# Patient Record
Sex: FEMALE | Race: WHITE | Employment: FULL TIME | URBAN - METROPOLITAN AREA
[De-identification: names, ages, dates, MRNs, and addresses within clinical notes are randomized per-mention and may not be internally consistent; named-entity substitution may affect disease eponyms.]

---

## 2019-10-11 ENCOUNTER — HOSPITAL ENCOUNTER (EMERGENCY)
Age: 59
Discharge: HOME | End: 2019-10-11

## 2019-10-11 VITALS
HEART RATE: 95 BPM | TEMPERATURE: 97.8 F | SYSTOLIC BLOOD PRESSURE: 144 MMHG | RESPIRATION RATE: 20 BRPM | WEIGHT: 170 LBS | OXYGEN SATURATION: 98 % | DIASTOLIC BLOOD PRESSURE: 78 MMHG

## 2019-10-11 DIAGNOSIS — L03.211 FACIAL CELLULITIS: Primary | ICD-10-CM

## 2019-10-11 PROCEDURE — 99212 OFFICE O/P EST SF 10 MIN: CPT

## 2019-10-11 PROCEDURE — 6370000000 HC RX 637 (ALT 250 FOR IP): Performed by: PHYSICIAN ASSISTANT

## 2019-10-11 RX ORDER — SULFAMETHOXAZOLE AND TRIMETHOPRIM 800; 160 MG/1; MG/1
1 TABLET ORAL 2 TIMES DAILY
Qty: 20 TABLET | Refills: 0 | Status: SHIPPED | OUTPATIENT
Start: 2019-10-11 | End: 2019-10-21

## 2019-10-11 RX ADMIN — FLUORESCEIN SODIUM 1 MG: 1 STRIP OPHTHALMIC at 08:54

## 2019-10-11 ASSESSMENT — VISUAL ACUITY
OD: 20/40
OU: 20/30
OS: 20/100
OD: 20/50
OU: 20/40
OS: 20/50

## 2020-06-04 ENCOUNTER — APPOINTMENT (OUTPATIENT)
Dept: GENERAL RADIOLOGY | Age: 60
End: 2020-06-04

## 2020-06-04 ENCOUNTER — HOSPITAL ENCOUNTER (EMERGENCY)
Age: 60
Discharge: HOME | End: 2020-06-04

## 2020-06-04 VITALS
HEART RATE: 89 BPM | OXYGEN SATURATION: 99 % | SYSTOLIC BLOOD PRESSURE: 145 MMHG | WEIGHT: 165 LBS | RESPIRATION RATE: 20 BRPM | DIASTOLIC BLOOD PRESSURE: 85 MMHG | TEMPERATURE: 98.5 F

## 2020-06-04 PROCEDURE — 90715 TDAP VACCINE 7 YRS/> IM: CPT | Performed by: PHYSICIAN ASSISTANT

## 2020-06-04 PROCEDURE — 6360000002 HC RX W HCPCS: Performed by: PHYSICIAN ASSISTANT

## 2020-06-04 PROCEDURE — 73610 X-RAY EXAM OF ANKLE: CPT

## 2020-06-04 PROCEDURE — 90471 IMMUNIZATION ADMIN: CPT | Performed by: PHYSICIAN ASSISTANT

## 2020-06-04 PROCEDURE — 99212 OFFICE O/P EST SF 10 MIN: CPT

## 2020-06-04 RX ORDER — CEPHALEXIN 500 MG/1
500 CAPSULE ORAL 3 TIMES DAILY
Qty: 21 CAPSULE | Refills: 0 | Status: SHIPPED | OUTPATIENT
Start: 2020-06-04 | End: 2020-06-11

## 2020-06-04 RX ADMIN — TETANUS TOXOID, REDUCED DIPHTHERIA TOXOID AND ACELLULAR PERTUSSIS VACCINE, ADSORBED 0.5 ML: 5; 2.5; 8; 8; 2.5 SUSPENSION INTRAMUSCULAR at 16:28

## 2020-06-04 NOTE — ED PROVIDER NOTES
Neurological:      Mental Status: She is alert and oriented to person, place, and time. GCS: GCS eye subscore is 4. GCS verbal subscore is 5. GCS motor subscore is 6. Cranial Nerves: No cranial nerve deficit. Sensory: No sensory deficit. Coordination: Coordination normal.      Gait: Gait normal.         Procedures    MDM  Number of Diagnoses or Management Options  Abrasion of left ankle, initial encounter:   Sprain of left ankle, unspecified ligament, initial encounter:   Diagnosis management comments: Xray reviewed. Instructions given  She has ace wrap at home.            --------------------------------------------- PAST HISTORY ---------------------------------------------  Past Medical History:  has no past medical history on file. Past Surgical History:  has no past surgical history on file. Social History:  reports that she has been smoking cigarettes. She has been smoking about 1.50 packs per day. She has never used smokeless tobacco. She reports that she does not use drugs. Family History: family history is not on file. The patients home medications have been reviewed. Allergies: Patient has no known allergies. -------------------------------------------------- RESULTS -------------------------------------------------  No results found for this visit on 06/04/20. XR ANKLE LEFT (MIN 3 VIEWS)   Final Result   No acute findings in the left ankle.             ------------------------- NURSING NOTES AND VITALS REVIEWED ---------------------------   The nursing notes within the ED encounter and vital signs as below have been reviewed.    BP (!) 145/85   Pulse 89   Temp 98.5 °F (36.9 °C) (Oral)   Resp 20   Wt 165 lb (74.8 kg)   SpO2 99%   Oxygen Saturation Interpretation: Normal      ------------------------------------------ PROGRESS NOTES ------------------------------------------   I have spoken with the patient and discussed todays results, in addition to

## 2021-12-07 ENCOUNTER — OFFICE VISIT (OUTPATIENT)
Dept: FAMILY MEDICINE CLINIC | Age: 61
End: 2021-12-07

## 2021-12-07 VITALS
RESPIRATION RATE: 16 BRPM | BODY MASS INDEX: 29.06 KG/M2 | WEIGHT: 157.9 LBS | OXYGEN SATURATION: 98 % | TEMPERATURE: 98.7 F | SYSTOLIC BLOOD PRESSURE: 120 MMHG | HEART RATE: 76 BPM | DIASTOLIC BLOOD PRESSURE: 70 MMHG | HEIGHT: 62 IN

## 2021-12-07 DIAGNOSIS — Z83.3 FH: DIABETES MELLITUS: ICD-10-CM

## 2021-12-07 DIAGNOSIS — Z80.3 FHX: BREAST CANCER IN FIRST DEGREE RELATIVE: ICD-10-CM

## 2021-12-07 DIAGNOSIS — Z76.89 ENCOUNTER TO ESTABLISH CARE: Primary | ICD-10-CM

## 2021-12-07 DIAGNOSIS — R05.3 CHRONIC COUGH: ICD-10-CM

## 2021-12-07 DIAGNOSIS — Z83.438 FH: HYPERLIPIDEMIA: ICD-10-CM

## 2021-12-07 DIAGNOSIS — Z12.31 ENCOUNTER FOR SCREENING MAMMOGRAM FOR BREAST CANCER: ICD-10-CM

## 2021-12-07 DIAGNOSIS — Z13.9 ENCOUNTER FOR SCREENING INVOLVING SOCIAL DETERMINANTS OF HEALTH (SDOH): ICD-10-CM

## 2021-12-07 DIAGNOSIS — F17.210 SMOKING GREATER THAN 40 PACK YEARS: ICD-10-CM

## 2021-12-07 PROCEDURE — 99203 OFFICE O/P NEW LOW 30 MIN: CPT | Performed by: NURSE PRACTITIONER

## 2021-12-07 SDOH — ECONOMIC STABILITY: INCOME INSECURITY: IN THE LAST 12 MONTHS, WAS THERE A TIME WHEN YOU WERE NOT ABLE TO PAY THE MORTGAGE OR RENT ON TIME?: NO

## 2021-12-07 SDOH — ECONOMIC STABILITY: FOOD INSECURITY: WITHIN THE PAST 12 MONTHS, THE FOOD YOU BOUGHT JUST DIDN'T LAST AND YOU DIDN'T HAVE MONEY TO GET MORE.: NEVER TRUE

## 2021-12-07 SDOH — ECONOMIC STABILITY: HOUSING INSECURITY
IN THE LAST 12 MONTHS, WAS THERE A TIME WHEN YOU DID NOT HAVE A STEADY PLACE TO SLEEP OR SLEPT IN A SHELTER (INCLUDING NOW)?: NO

## 2021-12-07 SDOH — ECONOMIC STABILITY: FOOD INSECURITY: WITHIN THE PAST 12 MONTHS, YOU WORRIED THAT YOUR FOOD WOULD RUN OUT BEFORE YOU GOT MONEY TO BUY MORE.: NEVER TRUE

## 2021-12-07 SDOH — ECONOMIC STABILITY: TRANSPORTATION INSECURITY
IN THE PAST 12 MONTHS, HAS LACK OF TRANSPORTATION KEPT YOU FROM MEETINGS, WORK, OR FROM GETTING THINGS NEEDED FOR DAILY LIVING?: NO

## 2021-12-07 SDOH — ECONOMIC STABILITY: TRANSPORTATION INSECURITY
IN THE PAST 12 MONTHS, HAS THE LACK OF TRANSPORTATION KEPT YOU FROM MEDICAL APPOINTMENTS OR FROM GETTING MEDICATIONS?: NO

## 2021-12-07 ASSESSMENT — PATIENT HEALTH QUESTIONNAIRE - PHQ9
SUM OF ALL RESPONSES TO PHQ QUESTIONS 1-9: 0
2. FEELING DOWN, DEPRESSED OR HOPELESS: 0
SUM OF ALL RESPONSES TO PHQ9 QUESTIONS 1 & 2: 0
SUM OF ALL RESPONSES TO PHQ QUESTIONS 1-9: 0
1. LITTLE INTEREST OR PLEASURE IN DOING THINGS: 0
SUM OF ALL RESPONSES TO PHQ QUESTIONS 1-9: 0

## 2021-12-07 ASSESSMENT — ENCOUNTER SYMPTOMS
RHINORRHEA: 0
CONSTIPATION: 0
COUGH: 1
NAUSEA: 0
COLOR CHANGE: 0
BACK PAIN: 0
SINUS PAIN: 0
WHEEZING: 0
CHEST TIGHTNESS: 0
ABDOMINAL PAIN: 0
DIARRHEA: 0
TROUBLE SWALLOWING: 0
FACIAL SWELLING: 0
SORE THROAT: 0
SHORTNESS OF BREATH: 1
VOMITING: 0
VOICE CHANGE: 0
SINUS PRESSURE: 0

## 2021-12-07 ASSESSMENT — SOCIAL DETERMINANTS OF HEALTH (SDOH): HOW HARD IS IT FOR YOU TO PAY FOR THE VERY BASICS LIKE FOOD, HOUSING, MEDICAL CARE, AND HEATING?: NOT HARD AT ALL

## 2021-12-07 NOTE — ASSESSMENT & PLAN NOTE
New Uncontrolled, lifestyle modifications recommended and declines medication today states she will just slow herself down.

## 2021-12-07 NOTE — PROGRESS NOTES
OFFICE PROGRESS NOTE  [address]  [address] [address] [id number]  Dept: [phone]   Chief Complaint   Patient presents with   Juani Coates Establish Care     Has not seen a Doctor in over 15 years    Health Maintenance     Due for Flu, shingles, mammogram, colonoscopy, diabetes screening, lipid, pap,hiv, covid Pneumococcal       ASSESSMENT/PLAN   1. Encounter to establish care  2. Chronic cough  Assessment & Plan:  New Unclear control, lifestyle modifications recommended and CXR today, fasting labs  Orders:  -     CBC Auto Differential; Future  -     Comprehensive Metabolic Panel; Future  -     XR CHEST (2 VW); Future  3. FH: diabetes mellitus  -     Hemoglobin A1C; Future  4. FH: hyperlipidemia  -     Lipid Panel; Future  5. Encounter for screening mammogram for breast cancer  -     INDIO DIGITAL SCREEN W OR WO CAD BILATERAL; Future  6. FHx: breast cancer in first degree relative  -     INDIO DIGITAL SCREEN W OR WO CAD BILATERAL; Future  7. Smoking greater than 40 pack years  Assessment & Plan:  New Uncontrolled, lifestyle modifications recommended and declines medication today states she will just slow herself down. 8. Encounter for screening involving social determinants of health [facility])  Assessment & Plan:   New referral to  for financial assistance  Orders:  -     Ambulatory referral to Social Work         Discussed smoking cessation and Discussed exercising 30 minutes daily    Return in about 1 month (around 1/7/2022) for Cough, test results, lab results. HPI:   Here to establish care hasn't seen a provider in 15 years or longer. She has hx of ADHD, allergic rhinitis, asthma, skin cancer, hx of TB. She has a long history of smoking for 48 years was up to 3 ppd and now trying to cut down and only smoking 1/2 - 1 ppd now. she has had a cough since 2019 when she was sick in November 2019 and no energy since then.  At times she will become SOB worse with mask on. She declines flu vaccine and Covid vaccine. She also has a history of TB. She states her weight has been stable. She hasn't had a mammogram in years and there is a FH of breast cancer in her mom. No current outpatient medications on file. Surgical History:  has a past surgical history that includes Clavicle excision; Tubal ligation; Gallbladder surgery; and Cholecystectomy. Social History:  reports that she has been smoking cigarettes. She has a 72.00 pack-year smoking history. She has never used smokeless tobacco. She reports previous alcohol use. She reports that she does not use drugs. Family History: family history includes Breast Cancer in her mother; COPD in her sister; Cancer in her father and sister; Diabetes in her brother and mother; High Blood Pressure in her mother; High Cholesterol in her mother. I have reviewed Roxanne's allergies, medications, problem list, medical, social and family history and have updated as needed in the electronic medical record    Review of Systems   Constitutional: Positive for fatigue. Negative for activity change, appetite change, chills, diaphoresis, fever and unexpected weight change. HENT: Positive for postnasal drip. Negative for congestion, dental problem, drooling, ear discharge, ear pain, facial swelling, hearing loss, mouth sores, nosebleeds, rhinorrhea, sinus pressure, sinus pain, sneezing, sore throat, tinnitus, trouble swallowing and voice change. Eyes: Negative for visual disturbance. Respiratory: Positive for cough and shortness of breath. Negative for chest tightness and wheezing. Hx of TB   Cardiovascular: Negative for chest pain, palpitations and leg swelling. Gastrointestinal: Negative for abdominal pain, constipation, diarrhea, nausea and vomiting. Endocrine: Negative for cold intolerance, heat intolerance, polydipsia, polyphagia and polyuria.    Genitourinary: Negative for difficulty urinating, frequency and urgency. Musculoskeletal: Negative for arthralgias, back pain, gait problem, joint swelling, myalgias, neck pain and neck stiffness. Skin: Negative for color change, pallor, rash and wound. Allergic/Immunologic: Positive for environmental allergies. Negative for food allergies and immunocompromised state. Neurological: Negative for dizziness, tremors, seizures, syncope, facial asymmetry, speech difficulty, weakness, light-headedness, numbness and headaches. Hematological: Negative for adenopathy. Does not bruise/bleed easily. Psychiatric/Behavioral: Negative for agitation, behavioral problems, confusion, decreased concentration, dysphoric mood, hallucinations, self-injury, sleep disturbance and suicidal ideas. The patient is not nervous/anxious and is not hyperactive. OBJECTIVE:     VS:  Wt Readings from Last 3 Encounters:   12/07/21 157 lb 14.4 oz (71.6 kg)   06/04/20 165 lb (74.8 kg)   10/11/19 170 lb (77.1 kg)                       Vitals:    12/07/21 1331   BP: 120/70   Pulse: 76   Resp: 16   Temp: 98.7 °F (37.1 °C)   TempSrc: Temporal   SpO2: 98%   Weight: 157 lb 14.4 oz (71.6 kg)   Height: 5' 2\" (1.575 m)       General: Alert and oriented to person, place, and time, well developed and well nourished, in no acute distress  SKIN: Warm and dry, intact without any rash, masses or lesions  HEAD: normocephalic, atraumatic  Eyes: sclera/conjunctiva clear, PERRLA, EOMI's intact  ENT: tympanic membranes, external ear and ear canal normal bilaterally, normal hearing, Nose without deformity, nasal mucosa and turbinates normal without polyps   Throat: clear, tongue midline, tonsils 2+, no drainage, no masses or lesions noted, rotting teeth  Neck: supple and non-tender without mass, trachea midline, no cervical lymphadenopathy, no bruit, no thyromegaly or nodules  Cardiovascular: regular rate and regular rhythm, normal S1 and S2,  no murmurs, rubs, clicks, or gallop.  Distal pulses intact, no carotid bruits. No edema  Pulmonary/Chest: clear but diminished in the bases to auscultation bilaterally, no wheezes, rales or rhonchi, diminished air movement, no respiratory distress  Abdomen: soft, non-tender, non-distended, normal bowel sounds, no masses or hepatosplenomegaly  Musculoskeletal: Normal ROM, no joint swelling, deformity or tenderness   Neurologic: reflexes normal and symmetric, no cranial nerve deficit, gait, coordination and speech normal  Extremities: no clubbing, cyanosis, or edema. Psychiatric: Good eye contact, normal mood and affect, answers questions appropriately    I have reviewed my findings and recommendations with Quinn Bundy.     Taylor Solis, NELLIE - CNP, NP-C, FNP-BC

## 2021-12-08 ENCOUNTER — TELEPHONE (OUTPATIENT)
Dept: FAMILY MEDICINE CLINIC | Age: 61
End: 2021-12-08

## 2021-12-08 DIAGNOSIS — Z83.3 FH: DIABETES MELLITUS: ICD-10-CM

## 2021-12-08 DIAGNOSIS — Z83.438 FH: HYPERLIPIDEMIA: ICD-10-CM

## 2021-12-08 DIAGNOSIS — R05.3 CHRONIC COUGH: ICD-10-CM

## 2021-12-08 DIAGNOSIS — J45.40 MODERATE PERSISTENT ASTHMA WITHOUT COMPLICATION: Primary | ICD-10-CM

## 2021-12-08 LAB
ALBUMIN SERPL-MCNC: 3.4 G/DL (ref 3.5–5.2)
ALP BLD-CCNC: 83 U/L (ref 35–104)
ALT SERPL-CCNC: 11 U/L (ref 0–32)
ANION GAP SERPL CALCULATED.3IONS-SCNC: 12 MMOL/L (ref 7–16)
AST SERPL-CCNC: 20 U/L (ref 0–31)
BASOPHILS ABSOLUTE: 0.06 E9/L (ref 0–0.2)
BASOPHILS RELATIVE PERCENT: 0.6 % (ref 0–2)
BILIRUB SERPL-MCNC: 0.3 MG/DL (ref 0–1.2)
BUN BLDV-MCNC: 5 MG/DL (ref 6–23)
CALCIUM SERPL-MCNC: 9.3 MG/DL (ref 8.6–10.2)
CHLORIDE BLD-SCNC: 103 MMOL/L (ref 98–107)
CHOLESTEROL, TOTAL: 149 MG/DL (ref 0–199)
CO2: 26 MMOL/L (ref 22–29)
CREAT SERPL-MCNC: 0.7 MG/DL (ref 0.5–1)
EOSINOPHILS ABSOLUTE: 0.34 E9/L (ref 0.05–0.5)
EOSINOPHILS RELATIVE PERCENT: 3.4 % (ref 0–6)
GFR AFRICAN AMERICAN: >60
GFR NON-AFRICAN AMERICAN: >60 ML/MIN/1.73
GLUCOSE BLD-MCNC: 93 MG/DL (ref 74–99)
HBA1C MFR BLD: 5.8 % (ref 4–5.6)
HCT VFR BLD CALC: 43.7 % (ref 34–48)
HDLC SERPL-MCNC: 34 MG/DL
HEMOGLOBIN: 14.1 G/DL (ref 11.5–15.5)
IMMATURE GRANULOCYTES #: 0.04 E9/L
IMMATURE GRANULOCYTES %: 0.4 % (ref 0–5)
LDL CHOLESTEROL CALCULATED: 82 MG/DL (ref 0–99)
LYMPHOCYTES ABSOLUTE: 4.47 E9/L (ref 1.5–4)
LYMPHOCYTES RELATIVE PERCENT: 45 % (ref 20–42)
MCH RBC QN AUTO: 29.1 PG (ref 26–35)
MCHC RBC AUTO-ENTMCNC: 32.3 % (ref 32–34.5)
MCV RBC AUTO: 90.3 FL (ref 80–99.9)
MONOCYTES ABSOLUTE: 0.6 E9/L (ref 0.1–0.95)
MONOCYTES RELATIVE PERCENT: 6 % (ref 2–12)
NEUTROPHILS ABSOLUTE: 4.43 E9/L (ref 1.8–7.3)
NEUTROPHILS RELATIVE PERCENT: 44.6 % (ref 43–80)
PDW BLD-RTO: 14.4 FL (ref 11.5–15)
PLATELET # BLD: 342 E9/L (ref 130–450)
PMV BLD AUTO: 11 FL (ref 7–12)
POTASSIUM SERPL-SCNC: 4 MMOL/L (ref 3.5–5)
RBC # BLD: 4.84 E12/L (ref 3.5–5.5)
SODIUM BLD-SCNC: 141 MMOL/L (ref 132–146)
TOTAL PROTEIN: 7 G/DL (ref 6.4–8.3)
TRIGL SERPL-MCNC: 163 MG/DL (ref 0–149)
VLDLC SERPL CALC-MCNC: 33 MG/DL
WBC # BLD: 9.9 E9/L (ref 4.5–11.5)

## 2021-12-08 RX ORDER — ALBUTEROL SULFATE 90 UG/1
2 AEROSOL, METERED RESPIRATORY (INHALATION) EVERY 6 HOURS PRN
Qty: 18 G | Refills: 3 | Status: SHIPPED | OUTPATIENT
Start: 2021-12-08

## 2021-12-08 RX ORDER — BUDESONIDE AND FORMOTEROL FUMARATE DIHYDRATE 80; 4.5 UG/1; UG/1
2 AEROSOL RESPIRATORY (INHALATION) 2 TIMES DAILY
Qty: 10.2 G | Refills: 3
Start: 2021-12-08 | End: 2021-12-10 | Stop reason: SDUPTHER

## 2021-12-08 NOTE — TELEPHONE ENCOUNTER
Spoke to pt, she states she does not recall the names of them because it was over 25 years ago. She does not have RX assistance, I gave her the [facility] information and phone number.

## 2021-12-08 NOTE — TELEPHONE ENCOUNTER
Spoke with pt, she states she is will to try an inhaler. She states that she was previously on 3 different ones.

## 2021-12-08 NOTE — TELEPHONE ENCOUNTER
Call Stephanie Huang is negative so could be related to her asthma hx.  Is she willing to try inhaler??? Will need RX assistance

## 2021-12-08 NOTE — TELEPHONE ENCOUNTER
Does she know what she was on before and did it work.  Does she have RX assistance if not give her information for [employer] please

## 2021-12-09 ENCOUNTER — TELEPHONE (OUTPATIENT)
Dept: INTERNAL MEDICINE | Age: 61
End: 2021-12-09

## 2021-12-10 ENCOUNTER — TELEPHONE (OUTPATIENT)
Dept: FAMILY MEDICINE CLINIC | Age: 61
End: 2021-12-10

## 2021-12-10 ENCOUNTER — TELEPHONE (OUTPATIENT)
Dept: INTERNAL MEDICINE | Age: 61
End: 2021-12-10

## 2021-12-10 DIAGNOSIS — J45.40 MODERATE PERSISTENT ASTHMA WITHOUT COMPLICATION: ICD-10-CM

## 2021-12-10 RX ORDER — BUDESONIDE AND FORMOTEROL FUMARATE DIHYDRATE 80; 4.5 UG/1; UG/1
2 AEROSOL RESPIRATORY (INHALATION) 2 TIMES DAILY
Qty: 10.2 G | Refills: 3 | Status: SHIPPED | OUTPATIENT
Start: 2021-12-10

## 2021-12-10 NOTE — TELEPHONE ENCOUNTER
Spoke with pt. Instructed her to cut back on sugars(Pepsi), breads, and pastas. Pt agrees and states she will try.

## 2021-12-10 NOTE — TELEPHONE ENCOUNTER
Received call from [facility]; referral was received and she will mail pt enrollment paperwork to complete and sign and return to [facility] office as well as to provider who will return to [facility] office after completion.

## 2021-12-10 NOTE — TELEPHONE ENCOUNTER
Rec'd call stating Leno Hodges ordered 2 inhalers for patient, but pharmacy only rec'd 1 RX. I noted order for Symbicort was placed, but did not go through electronically. Please submit E script so patient can  medication today.     Last seen 12/7/2021  Next appt 1/10/2022  [facility]

## 2022-01-06 PROBLEM — Z12.31 ENCOUNTER FOR SCREENING MAMMOGRAM FOR BREAST CANCER: Status: RESOLVED | Noted: 2021-12-07 | Resolved: 2022-01-06

## 2022-08-15 ENCOUNTER — TELEPHONE (OUTPATIENT)
Dept: OTHER | Facility: CLINIC | Age: 62
End: 2022-08-15

## 2022-08-15 NOTE — TELEPHONE ENCOUNTER
Quinn Bundy, Was contacted today as part of [facility] to schedule a mammogram.         After speaking with patient has not had a recent mammogram.  Reminded patient that they have an open order from  NELLIE Craft CNP and discussed the importance of routine breast cancer screenings. Pt reports that she can't afford to have a mammogram done. This nurse offered the financial assistance telephone number to pt and she reports that she already tried that. Pt encouraged to call and schedule mammogram as soon as circumstances changed, pt states an understanding.      [facility], LPN